# Patient Record
(demographics unavailable — no encounter records)

---

## 2018-09-27 NOTE — XRAY REPORT
FINAL REPORT



EXAM:  XR HAND 3+V LT



HISTORY:  hand pain 



TECHNIQUE:  3 views of left hand.



PRIORS:  None.



FINDINGS:  

No apparent fracture or dislocation. 



Joint spaces maintained.  



Soft tissues grossly unremarkable. 



IMPRESSION:  

1. No acute osseous abnormality.

## 2018-09-27 NOTE — XRAY REPORT
FINAL REPORT



EXAM:  XR KNEE 3V RT



HISTORY:  knee pain 



TECHNIQUE:  3 views of right knee. 



PRIORS:  None.



FINDINGS:  

No apparent fracture or dislocation. 



Joint spaces maintained.  



Soft tissues grossly unremarkable. 



IMPRESSION:  

1. No acute osseous abnormality.

## 2018-09-27 NOTE — XRAY REPORT
FINAL REPORT



EXAM:  XR FOOT 3+V RT



HISTORY:  foot pain 



TECHNIQUE:  3 views of right foot. 



PRIORS:  None.



FINDINGS:  

Mild hallux valgus and soft tissue bunion formation, with

probable subchondral cystic change in the medial eminence of

great toe metatarsal head. Joint spaces maintained.  



No apparent fracture or dislocation. 



Soft tissues grossly unremarkable. 



IMPRESSION:  

1. No acute osseous abnormality. 



2. Hallux valgus and soft tissue bunion formation.